# Patient Record
Sex: FEMALE | Race: WHITE | HISPANIC OR LATINO | Employment: FULL TIME | URBAN - METROPOLITAN AREA
[De-identification: names, ages, dates, MRNs, and addresses within clinical notes are randomized per-mention and may not be internally consistent; named-entity substitution may affect disease eponyms.]

---

## 2021-03-02 PROBLEM — F41.1 GENERALIZED ANXIETY DISORDER: Status: ACTIVE | Noted: 2021-03-02

## 2021-03-02 PROBLEM — G43.709 CHRONIC MIGRAINE WITHOUT AURA WITHOUT STATUS MIGRAINOSUS, NOT INTRACTABLE: Status: ACTIVE | Noted: 2021-03-02

## 2022-10-09 NOTE — PROGRESS NOTES
[facility] Internal Medicine- [place]    NAME: Fay Ramos  AGE: 38 y o  SEX: female    DATE OF ENCOUNTER: 10/10/2022    Assessment and Plan/History of Present Illness     Here today for follow-up  1  Chronic midline low back pain without sciatica  Assessment & Plan:  -Acute on chronic low back pain  Patient states she had a work accident in [place] 9 years ago and injured her back  -2 week duration of bilateral lumbar back pain  Radiates into the left thoracic area  She denies any dysuria, increased urinary frequency, bladder pressure  No inciting injuries  Pain is intermittent, worse with standing  Her exam is benign today  -suspect musculoskeletal etiology, no red flags  -no need for imaging at this time  Treat with NSAID p r n , topical analgesics, ice/heat will also send muscle relaxant to pharmacy      2  Encounter for immunization  -     FLUBLOK: influenza vaccine, quadrivalent, recombinant, PF, 0 5 mL    3  Screening for cervical cancer    4  Acute bilateral low back pain without sciatica  -     methocarbamol (ROBAXIN) 500 mg tablet; Take 1 tablet (500 mg total) by mouth 3 (three) times a day as needed for muscle spasms    5  Chronic migraine without aura without status migrainosus, not intractable  Assessment & Plan:  Well controlled, migraines appear to be infrequent at this time  Tolerating Topamax without issue  -continue Topamax  -Imitrex p r n                 Orders Placed This Encounter   Procedures   • FLUBLOK: influenza vaccine, quadrivalent, recombinant, PF, 0 5 mL       Chief Complaint     Chief Complaint   Patient presents with   • Back Pain     Back lower pain times 3 weeks and getting worse        Review of Systems     10 point ROS negative except per HPI    The following portions of the patient's history were reviewed and updated as appropriate: allergies, current medications, past family history, past medical history, past social history, past surgical history and problem list     Active Problem List     Patient Active Problem List   Diagnosis   • Chronic migraine without aura without status migrainosus, not intractable   • Generalized anxiety disorder   • Chronic midline low back pain without sciatica       Objective     /64 (BP Location: Left arm, Patient Position: Sitting, Cuff Size: Standard)   Pulse 82   Temp (!) 97 2 °F (36 2 °C)   Resp 16   Ht 5' 2" (1 575 m)   Wt 62 1 kg (137 lb)   SpO2 100%   BMI 25 06 kg/m²     Physical Exam  Constitutional:       Appearance: Normal appearance  She is not ill-appearing  HENT:      Head: Normocephalic and atraumatic  Eyes:      General: No scleral icterus  Right eye: No discharge  Left eye: No discharge  Cardiovascular:      Rate and Rhythm: Normal rate and regular rhythm  Heart sounds: No murmur heard  No friction rub  Pulmonary:      Effort: Pulmonary effort is normal       Breath sounds: Normal breath sounds  No wheezing or rales  Abdominal:      General: Abdomen is flat  There is no distension  Palpations: Abdomen is soft  Tenderness: There is no abdominal tenderness  Musculoskeletal:         General: No swelling or tenderness  Skin:     General: Skin is warm and dry  Findings: No erythema  Neurological:      Mental Status: She is alert  Mental status is at baseline  Motor: No weakness  Psychiatric:         Mood and Affect: Mood normal          Behavior: Behavior normal          Pertinent Laboratory/Diagnostic Studies:  US breast right limited (diagnostic)    Result Date: 4/21/2022  Impression: Right breast finding is benign    No mammographic evidence of malignancy in the right breast  ASSESSMENT/BI-RADS CATEGORY: Right: 2 - Benign Overall: 2 - Benign RECOMMENDATION:      - Routine screening mammogram in 1 year for the right breast  Workstation ID: [id number]     Mammo diagnostic right w 3d & cad    Result Date: 4/21/2022  Impression: Right breast finding is benign  No mammographic evidence of malignancy in the right breast  ASSESSMENT/BI-RADS CATEGORY: Right: 2 - Benign Overall: 2 - Benign RECOMMENDATION:      - Routine screening mammogram in 1 year for the right breast  Workstation ID: [id number]       Images and diagnostics reviewed     Current Medications     Current Outpatient Medications:   •  methocarbamol (ROBAXIN) 500 mg tablet, Take 1 tablet (500 mg total) by mouth 3 (three) times a day as needed for muscle spasms, Disp: 30 tablet, Rfl: 0  •  SUMAtriptan (Imitrex) 50 mg tablet, Take 1 tablet (50 mg total) by mouth once as needed for migraine for up to 1 dose May repeat dose (1 tablet by mouth) after 2 hours if no relief from initial dose, Disp: 9 tablet, Rfl: 0  •  topiramate (TOPAMAX) 25 mg sprinkle capsule, TAKE 1 CAPSULE(25 MG) BY MOUTH TWICE DAILY, Disp: 90 capsule, Rfl: 0    Health Maintenance     Health Maintenance   Topic Date Due   • Hepatitis C Screening  Never done   • Annual Physical  Never done   • DTaP,Tdap,and Td Vaccines (1 - Tdap) Never done   • Cervical Cancer Screening  Never done   • Depression Screening  03/23/2023   • BMI: Followup Plan  03/23/2023   • Breast Cancer Screening: Mammogram  04/21/2023   • BMI: Adult  10/10/2023   • HIV Screening  Completed   • Influenza Vaccine  Completed   • COVID-19 Vaccine  Completed   • Pneumococcal Vaccine: Pediatrics (0 to 5 Years) and At-Risk Patients (6 to 59 Years)  Aged Out   • HIB Vaccine  Aged Out   • Hepatitis B Vaccine  Aged Out   • IPV Vaccine  Aged Out   • Hepatitis A Vaccine  Aged Out   • Meningococcal ACWY Vaccine  Aged Out   • HPV Vaccine  Aged [employer] History   Administered Date(s) Administered   • COVID-19 MODERNA VACC 0 5 ML IM 04/06/2021, 05/07/2021, 01/07/2022   • Influenza, recombinant, quadrivalent,injectable, preservative free 10/10/2022       ANNIE Patel  Internal Medicine [facility] [address], [facility] [address]  [place], [address]   Office: [phone]  Fax: [phone]

## 2022-10-10 ENCOUNTER — OFFICE VISIT (OUTPATIENT)
Dept: INTERNAL MEDICINE CLINIC | Facility: CLINIC | Age: 42
End: 2022-10-10
Payer: COMMERCIAL

## 2022-10-10 VITALS
DIASTOLIC BLOOD PRESSURE: 64 MMHG | SYSTOLIC BLOOD PRESSURE: 100 MMHG | OXYGEN SATURATION: 100 % | HEART RATE: 82 BPM | WEIGHT: 137 LBS | BODY MASS INDEX: 25.21 KG/M2 | TEMPERATURE: 97.2 F | RESPIRATION RATE: 16 BRPM | HEIGHT: 62 IN

## 2022-10-10 DIAGNOSIS — M54.50 CHRONIC MIDLINE LOW BACK PAIN WITHOUT SCIATICA: Primary | ICD-10-CM

## 2022-10-10 DIAGNOSIS — G43.709 CHRONIC MIGRAINE WITHOUT AURA WITHOUT STATUS MIGRAINOSUS, NOT INTRACTABLE: ICD-10-CM

## 2022-10-10 DIAGNOSIS — Z23 ENCOUNTER FOR IMMUNIZATION: ICD-10-CM

## 2022-10-10 DIAGNOSIS — Z12.4 SCREENING FOR CERVICAL CANCER: ICD-10-CM

## 2022-10-10 DIAGNOSIS — M54.50 ACUTE BILATERAL LOW BACK PAIN WITHOUT SCIATICA: ICD-10-CM

## 2022-10-10 DIAGNOSIS — G89.29 CHRONIC MIDLINE LOW BACK PAIN WITHOUT SCIATICA: Primary | ICD-10-CM

## 2022-10-10 PROCEDURE — 90471 IMMUNIZATION ADMIN: CPT | Performed by: INTERNAL MEDICINE

## 2022-10-10 PROCEDURE — 90682 RIV4 VACC RECOMBINANT DNA IM: CPT | Performed by: INTERNAL MEDICINE

## 2022-10-10 PROCEDURE — 99214 OFFICE O/P EST MOD 30 MIN: CPT | Performed by: INTERNAL MEDICINE

## 2022-10-10 RX ORDER — METHOCARBAMOL 500 MG/1
500 TABLET, FILM COATED ORAL 3 TIMES DAILY PRN
Qty: 30 TABLET | Refills: 0 | Status: SHIPPED | OUTPATIENT
Start: 2022-10-10

## 2022-10-10 NOTE — ASSESSMENT & PLAN NOTE
Well controlled, migraines appear to be infrequent at this time  Tolerating Topamax without issue  -continue Topamax  -Imitrex p r n

## 2022-10-10 NOTE — ASSESSMENT & PLAN NOTE
-Acute on chronic low back pain  Patient states she had a work accident in [place] 9 years ago and injured her back  -2 week duration of bilateral lumbar back pain  Radiates into the left thoracic area  She denies any dysuria, increased urinary frequency, bladder pressure  No inciting injuries  Pain is intermittent, worse with standing  Her exam is benign today  -suspect musculoskeletal etiology, no red flags  -no need for imaging at this time    Treat with NSAID p r n , topical analgesics, ice/heat will also send muscle relaxant to pharmacy

## 2022-10-21 DIAGNOSIS — G43.709 CHRONIC MIGRAINE WITHOUT AURA WITHOUT STATUS MIGRAINOSUS, NOT INTRACTABLE: ICD-10-CM

## 2022-10-21 RX ORDER — TOPIRAMATE 25 MG/1
25 CAPSULE, COATED PELLETS ORAL 2 TIMES DAILY
Qty: 180 CAPSULE | Refills: 0 | Status: SHIPPED | OUTPATIENT
Start: 2022-10-21

## 2022-10-25 ENCOUNTER — TELEPHONE (OUTPATIENT)
Dept: INTERNAL MEDICINE CLINIC | Facility: CLINIC | Age: 42
End: 2022-10-25

## 2022-10-25 DIAGNOSIS — G43.909 ACUTE MIGRAINE: Primary | ICD-10-CM

## 2022-10-25 RX ORDER — PROCHLORPERAZINE MALEATE 10 MG
10 TABLET ORAL EVERY 8 HOURS PRN
Qty: 15 TABLET | Refills: 0 | Status: SHIPPED | OUTPATIENT
Start: 2022-10-25 | End: 2022-10-30

## 2022-10-25 RX ORDER — NAPROXEN 500 MG/1
500 TABLET ORAL 2 TIMES DAILY WITH MEALS
Qty: 10 TABLET | Refills: 0 | Status: SHIPPED | OUTPATIENT
Start: 2022-10-25 | End: 2022-10-31

## 2022-10-25 NOTE — TELEPHONE ENCOUNTER
Patient called to inform topiramate 25 mg is no longer relieving her migraines  She is now experiencing nausea w/migraines

## 2022-10-26 NOTE — TELEPHONE ENCOUNTER
Patient called back, she verbalized understanding the recommendation, was at pharmacy picking up medications sent  Patient confirmed she has never taken Imitrex

## 2022-11-04 ENCOUNTER — TELEPHONE (OUTPATIENT)
Dept: INTERNAL MEDICINE CLINIC | Facility: CLINIC | Age: 42
End: 2022-11-04

## 2022-11-04 NOTE — TELEPHONE ENCOUNTER
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 If so, she needs it refilled

## 2022-11-07 DIAGNOSIS — G43.909 ACUTE MIGRAINE: ICD-10-CM

## 2022-11-07 RX ORDER — PROCHLORPERAZINE MALEATE 10 MG
10 TABLET ORAL EVERY 8 HOURS PRN
Qty: 15 TABLET | Refills: 0 | Status: SHIPPED | OUTPATIENT
Start: 2022-11-07 | End: 2022-11-16

## 2022-11-11 ENCOUNTER — OFFICE VISIT (OUTPATIENT)
Dept: OBGYN CLINIC | Facility: MEDICAL CENTER | Age: 42
End: 2022-11-11

## 2022-11-11 VITALS
WEIGHT: 136.9 LBS | DIASTOLIC BLOOD PRESSURE: 74 MMHG | SYSTOLIC BLOOD PRESSURE: 115 MMHG | BODY MASS INDEX: 25.19 KG/M2 | HEIGHT: 62 IN

## 2022-11-11 DIAGNOSIS — Z01.419 ENCOUNTER FOR WELL WOMAN EXAM WITH ROUTINE GYNECOLOGICAL EXAM: Primary | ICD-10-CM

## 2022-11-11 DIAGNOSIS — R10.2 PELVIC PAIN: ICD-10-CM

## 2022-11-11 DIAGNOSIS — Z12.31 ENCOUNTER FOR SCREENING MAMMOGRAM FOR MALIGNANT NEOPLASM OF BREAST: ICD-10-CM

## 2022-11-11 NOTE — PROGRESS NOTES
ASSESSMENT & PLAN: Jose A Cruz is a 38 y o  [id number] with normal gynecologic exam     1   Routine well woman exam done today  2  Pap and HPV:  The patient's last pap and hpv was   It was normal     Pap and cotesting was done today  Current ASCCP Guidelines reviewed  - hx of abnormal pap   3  Mammogram ordered  4  The following were reviewed in today's visit: breast self exam, mammography screening ordered, exercise and healthy diet  5  Low libido - supportive measures reviewed    6  Pelvic Pain - US ordered      CC:  Annual Gynecologic Examination    HPI: Jose A Cruz is a 38 y o  [id number] who presents for annual gynecologic examination  She has the following concerns:  Lack of sexual desire / occasional pelvic pain on RLQ    Health Maintenance:    She wears her seatbelt routinely  She does perform regular monthly self breast exams  She feels safe at home       Patient Active Problem List   Diagnosis   • Chronic migraine without aura without status migrainosus, not intractable   • Generalized anxiety disorder   • Chronic midline low back pain without sciatica       Past Medical History:   Diagnosis Date   • Known health problems: none        Past Surgical History:   Procedure Laterality Date   • TUBAL LIGATION         Past OB/Gyn History:  OB History        3    Para   2    Term   2            AB   1    Living   2       SAB        IAB        Ectopic        Multiple        Live Births                          Family History   Problem Relation Age of Onset   • Substance Abuse Mother    • Alcohol abuse Mother    • Liver disease Mother    • Substance Abuse Father    • Stroke Father    • Diabetes Father    • Lupus Sister    • No Known Problems Maternal Grandmother    • No Known Problems Maternal Grandfather    • No Known Problems Paternal Grandmother    • No Known Problems Paternal Grandfather    • No Known Problems Sister    • No Known Problems Maternal Aunt        Social History:  Social History     Socioeconomic History   • Marital status: /Civil Union     Spouse name: Not on file   • Number of children: Not on file   • Years of education: Not on file   • Highest education level: Not on file   Occupational History   • Not on file   Tobacco Use   • Smoking status: Never Smoker   • Smokeless tobacco: Never Used   Vaping Use   • Vaping Use: Not on file   Substance and Sexual Activity   • Alcohol use: Never   • Drug use: Never   • Sexual activity: Yes   Other Topics Concern   • Not on file   Social History Narrative   • Not on file     Social Determinants of Health     Financial Resource Strain: Not on file   Food Insecurity: Not on file   Transportation Needs: Not on file   Physical Activity: Not on file   Stress: Not on file   Social Connections: Not on file   Intimate Partner Violence: Not on file   Housing Stability: Not on file       Allergies   Allergen Reactions   • Aspirin Rash     Rash all over body       Current Outpatient Medications:   •  methocarbamol (ROBAXIN) 500 mg tablet, Take 1 tablet (500 mg total) by mouth 3 (three) times a day as needed for muscle spasms, Disp: 30 tablet, Rfl: 0  •  naproxen (NAPROSYN) 500 mg tablet, TAKE 1 TABLET(500 MG) BY MOUTH TWICE DAILY WITH MEALS FOR 5 DAYS, Disp: 10 tablet, Rfl: 1  •  prochlorperazine (COMPAZINE) 10 mg tablet, Take 1 tablet (10 mg total) by mouth every 8 (eight) hours as needed for nausea or vomiting for up to 5 days, Disp: 15 tablet, Rfl: 0  •  SUMAtriptan (Imitrex) 50 mg tablet, Take 1 tablet (50 mg total) by mouth once as needed for migraine for up to 1 dose May repeat dose (1 tablet by mouth) after 2 hours if no relief from initial dose, Disp: 9 tablet, Rfl: 0  •  topiramate (TOPAMAX) 25 mg sprinkle capsule, Take 1 capsule (25 mg total) by mouth 2 (two) times a day, Disp: 180 capsule, Rfl: 0    Review of Systems:    Review of Systems  Constitutional :no fever, feels well, no tiredness, no recent weight gain or loss  ENT: no ear ache, no loss of hearing, no nosebleeds or nasal discharge, no sore throat or hoarseness  Cardiovascular: no complaints of slow or fast heart beat, no chest pain, no palpitations, no leg claudication or lower extremity edema  Respiratory: no complaints of shortness of shortness of breath, no VANESSA  Breasts:no complaints of breast pain, breast lump, or nipple discharge  Gastrointestinal: no complaints of abdominal pain, constipation, nausea, vomiting, or diarrhea or bloody stools  Genitourinary : as noted in HPI  Musculoskeletal: no complaints of arthralgia, no myalgia, no joint swelling or stiffness, no limb pain or swelling  Integumentary: no complaints of skin rash or lesion, itching or dry skin  Neurological: no complaints of headache, no confusion, no numbness or tingling, no dizziness or fainting    Objective      /74   Ht 5' 2" (1 575 m)   Wt 62 1 kg (136 lb 14 4 oz)   LMP 10/22/2022   BMI 25 04 kg/m²     General:   appears stated age, cooperative, alert normal mood and affect   Lungs: Unlabored breathing     Breasts: normal appearance, no masses or tenderness   Abdomen: soft, non-tender, without masses or organomegaly   Vulva: normal   Vagina: normal vagina, no discharge, exudate, lesion, or erythema   Urethra: normal   Cervix: Normal, no discharge  Nontender     Uterus: normal size, contour, position, consistency, mobility, non-tender   Adnexa: no mass, fullness, tenderness   Psychiatric orientation to person, place, and time: normal  mood and affect: normal

## 2022-11-14 LAB
HPV HR 12 DNA CVX QL NAA+PROBE: NEGATIVE
HPV16 DNA CVX QL NAA+PROBE: NEGATIVE
HPV18 DNA CVX QL NAA+PROBE: NEGATIVE

## 2022-11-16 ENCOUNTER — OFFICE VISIT (OUTPATIENT)
Dept: INTERNAL MEDICINE CLINIC | Facility: CLINIC | Age: 42
End: 2022-11-16

## 2022-11-16 VITALS
DIASTOLIC BLOOD PRESSURE: 58 MMHG | TEMPERATURE: 97.3 F | HEIGHT: 62 IN | SYSTOLIC BLOOD PRESSURE: 110 MMHG | WEIGHT: 136.6 LBS | BODY MASS INDEX: 25.14 KG/M2 | OXYGEN SATURATION: 99 % | HEART RATE: 89 BPM

## 2022-11-16 DIAGNOSIS — G43.909 ACUTE MIGRAINE: Primary | ICD-10-CM

## 2022-11-16 LAB
LAB AP GYN PRIMARY INTERPRETATION: NORMAL
Lab: NORMAL
PATH INTERP SPEC-IMP: NORMAL

## 2022-11-16 RX ORDER — DIPHENHYDRAMINE HCL 25 MG
25 TABLET ORAL EVERY 6 HOURS PRN
Qty: 30 TABLET | Refills: 0 | Status: SHIPPED | OUTPATIENT
Start: 2022-11-16

## 2022-11-16 NOTE — PROGRESS NOTES
INTERNAL MEDICINE OFFICE VISIT NOTE  [address] Internal Medicine [place]    NAME: Radonna Lesches  AGE: 38 y o  SEX: female    DATE OF ENCOUNTER: 11/16/2022      Chief Complaint   Patient presents with   • Migraine     2 weeks increasing daily, nausea, dizziness, sensitive to light      Presents as a same-day appointment secondary to migraine headache for 2 weeks  Feels has been progressively getting worse  Now experiencing nausea without vomiting, dizziness and photophobia  Has been taking naproxen with minimal relief, and Topamax twice a day  She is prescribed Imitrex but has not tried yet  Review of Systems  10 point ROS negative except per HPI    OBJECTIVE:  Vitals:    11/16/22 1018   BP: 110/58   BP Location: Left arm   Patient Position: Sitting   Cuff Size: Standard   Pulse: 89   Temp: (!) 97 3 °F (36 3 °C)   SpO2: 99%   Weight: 62 kg (136 lb 9 6 oz)   Height: 5' 2" (1 575 m)       Physical Exam:   GENERAL: NAD, Normal appearance  Non diaphoretic, non-toxic, not ill-appearing, well-developed, well-nourished  NEUROLOGIC:  Alert/oriented x3  No motor or sensory deficits  HEENT:  NC/AT, PERRL, EOMI, MMM, no scleral icterus  CARDIAC:  RRR, +S1/S2, no S3/S4 heard, no m/g/r  PULMONARY:  non-labored breathing, CTA B/L, no wheezing/rales/rhonci appreciated at time of encounter  ABDOMEN:  Soft, NT/ND, +BS, no rebound/guarding/rigidity  Extremities:  2+ Pulses in DP/PT  No edema, cyanosis, or clubbing  SKIN:  No rashes or erythema    ASSESSMENT/PLAN:    1  Acute migraine  Assessment & Plan:  Progressively getting worse in the last 2 weeks  Associated with nausea, dizziness and for for  Naproxen with minimal relief and Topamax twice a day  Imitrex prescribed - not taking  - advised her to take Imitrex as recommended concurrent with Compazine and Benadryl  - advised her to contact our office if symptoms are not improved    Orders:  -     diphenhydrAMINE (BENADRYL) 25 mg tablet;  Take 1 tablet (25 mg total) by mouth every 6 (six) hours as needed for itching        Current Outpatient Medications:   •  diphenhydrAMINE (BENADRYL) 25 mg tablet, Take 1 tablet (25 mg total) by mouth every 6 (six) hours as needed for itching, Disp: 30 tablet, Rfl: 0  •  methocarbamol (ROBAXIN) 500 mg tablet, Take 1 tablet (500 mg total) by mouth 3 (three) times a day as needed for muscle spasms, Disp: 30 tablet, Rfl: 0  •  naproxen (NAPROSYN) 500 mg tablet, TAKE 1 TABLET(500 MG) BY MOUTH TWICE DAILY WITH MEALS FOR 5 DAYS, Disp: 10 tablet, Rfl: 1  •  prochlorperazine (COMPAZINE) 10 mg tablet, Take 1 tablet (10 mg total) by mouth every 8 (eight) hours as needed for nausea or vomiting for up to 5 days, Disp: 15 tablet, Rfl: 0  •  SUMAtriptan (Imitrex) 50 mg tablet, Take 1 tablet (50 mg total) by mouth once as needed for migraine for up to 1 dose May repeat dose (1 tablet by mouth) after 2 hours if no relief from initial dose, Disp: 9 tablet, Rfl: 0  •  topiramate (TOPAMAX) 25 mg sprinkle capsule, Take 1 capsule (25 mg total) by mouth 2 (two) times a day, Disp: 180 capsule, Rfl: 0    TCM Call     None      TCM Call     None             Marcos Martínez MD  [facility] Internal Medicine [place]

## 2022-11-16 NOTE — ASSESSMENT & PLAN NOTE
Progressively getting worse in the last 2 weeks  Associated with nausea, dizziness and for for  Naproxen with minimal relief and Topamax twice a day  Imitrex prescribed - not taking  - advised her to take Imitrex as recommended concurrent with Compazine and Benadryl  - advised her to contact our office if symptoms are not improved

## 2023-05-08 DIAGNOSIS — G43.909 ACUTE MIGRAINE: ICD-10-CM

## 2023-05-08 RX ORDER — NAPROXEN 500 MG/1
500 TABLET ORAL 2 TIMES DAILY WITH MEALS
Qty: 30 TABLET | Refills: 0 | Status: SHIPPED | OUTPATIENT
Start: 2023-05-08